# Patient Record
Sex: MALE | Race: BLACK OR AFRICAN AMERICAN | NOT HISPANIC OR LATINO | URBAN - METROPOLITAN AREA
[De-identification: names, ages, dates, MRNs, and addresses within clinical notes are randomized per-mention and may not be internally consistent; named-entity substitution may affect disease eponyms.]

---

## 2023-02-23 ENCOUNTER — OFFICE VISIT (OUTPATIENT)
Dept: FAMILY MEDICINE CLINIC | Facility: CLINIC | Age: 43
End: 2023-02-23

## 2023-02-23 VITALS
RESPIRATION RATE: 16 BRPM | SYSTOLIC BLOOD PRESSURE: 138 MMHG | DIASTOLIC BLOOD PRESSURE: 92 MMHG | BODY MASS INDEX: 38.72 KG/M2 | WEIGHT: 276.6 LBS | TEMPERATURE: 97.1 F | OXYGEN SATURATION: 97 % | HEART RATE: 101 BPM | HEIGHT: 71 IN

## 2023-02-23 DIAGNOSIS — Z11.59 NEED FOR HEPATITIS C SCREENING TEST: ICD-10-CM

## 2023-02-23 DIAGNOSIS — Z91.89 NEED FOR DENTAL CARE: ICD-10-CM

## 2023-02-23 DIAGNOSIS — Z00.00 ANNUAL PHYSICAL EXAM: Primary | ICD-10-CM

## 2023-02-23 DIAGNOSIS — E66.09 CLASS 2 OBESITY DUE TO EXCESS CALORIES WITHOUT SERIOUS COMORBIDITY WITH BODY MASS INDEX (BMI) OF 38.0 TO 38.9 IN ADULT: ICD-10-CM

## 2023-02-23 DIAGNOSIS — G47.33 OSA (OBSTRUCTIVE SLEEP APNEA): ICD-10-CM

## 2023-02-23 DIAGNOSIS — Z11.4 SCREENING FOR HIV (HUMAN IMMUNODEFICIENCY VIRUS): ICD-10-CM

## 2023-02-23 DIAGNOSIS — Z23 ENCOUNTER FOR IMMUNIZATION: ICD-10-CM

## 2023-02-23 DIAGNOSIS — R03.0 ELEVATED BP WITHOUT DIAGNOSIS OF HYPERTENSION: ICD-10-CM

## 2023-02-23 NOTE — PROGRESS NOTES
[address] [facility]    NAME: Reyna Foss  AGE: 38 y o  SEX: male  : 1980     DATE: 2023     Assessment and Plan:       Problem List Items Addressed This Visit        Respiratory    STEPHANIE (obstructive sleep apnea)     Patient at high risk for sleep apnea with snoring, daytime fatigue, witnessed apneic episodes, BMI> 35, and male sex  - Ambulatory referral to sleep medicine placed for consult as well as sleep study  Relevant Orders    Ambulatory Referral to Sleep Medicine       Other    Elevated BP without diagnosis of hypertension     Patient has mildly elevated diastolic pressure today (635/23)  No history of high BP and has never had elevated values on prior readings  Patient has an extremely unhealthy diet (hamburgers for breakfast, lunch, and dinner) and sometimes chinese food  Does not eat many fruits/vegetables  Consumes a fairly high amount of salt  - Explained the risks of  - Counseled patient extensively on appropriate diet as well as exercise and the types of food he should be eating  Mediterranean diet info provided in AVS    - Counseled on low sodium intake  - Will continue to monitor BP for now- advised him that weight loss can help bring his            Class 2 obesity due to excess calories without serious comorbidity with body mass index (BMI) of 38 0 to 38 9 in adult     - Lipid panel and HbA1C ordered            Relevant Orders    Lipid Panel with Direct LDL reflex    HEMOGLOBIN A1C W/ EAG ESTIMATION   Other Visit Diagnoses     Annual physical exam    -  Primary    Need for hepatitis C screening test        Relevant Orders    Hepatitis C Antibody ([facility])    Screening for HIV (human immunodeficiency virus)        Relevant Orders    HIV 1/2 AG/AB w Reflex [facility] for 2 yr old and above    Encounter for immunization        Relevant Orders    influenza vaccine, quadrivalent, 0 5 mL, preservative-free, for adult and pediatric patients 6 mos+ (AFLURIA, FLUARIX, FLULAVAL, FLUZONE) (Completed)    TDAP VACCINE GREATER THAN OR EQUAL TO 8YO IM (Completed)    Need for dental care        Relevant Orders    Ambulatory Referral to Dentistry          Immunizations and preventive care screenings were discussed with patient today  Appropriate education was printed on patient's after visit summary  Counseling:  Alcohol/drug use: discussed moderation in alcohol intake, the recommendations for healthy alcohol use, and avoidance of illicit drug use  Dental Health: discussed importance of regular tooth brushing, flossing, and dental visits  Injury prevention: discussed safety/seat belts, safety helmets, smoke detectors, carbon dioxide detectors, and smoking near bedding or upholstery  Sexual health: discussed sexually transmitted diseases, partner selection, use of condoms, avoidance of unintended pregnancy, and contraceptive alternatives  · Exercise: the importance of regular exercise/physical activity was discussed  Recommend exercise 3-5 times per week for at least 30 minutes  · Patient is currently seeing a financial counselor- he states he just got a job and will be getting insurance on April 1st  He will get his blood work done after that  Patient does not drink, smoke, or use illicit drugs  · Patient received flu and Tdap vaccines today  · PHQ-2: 0  · Form for [facility] regarding possible hypertension filled out today and given to Rolo to return to patient  · HIV and Hepatitis C screening ordered  BMI Counseling: Body mass index is 38 58 kg/m²   The BMI is above normal  Nutrition recommendations include decreasing portion sizes, encouraging healthy choices of fruits and vegetables, decreasing fast food intake, limiting drinks that contain sugar, moderation in carbohydrate intake, increasing intake of lean protein, reducing intake of saturated and trans fat and reducing intake of cholesterol  Exercise recommendations include moderate physical activity 150 minutes/week, exercising 3-5 times per week and obtaining a gym membership  No pharmacotherapy was ordered  Patient referred to PCP  Rationale for BMI follow-up plan is due to patient being overweight or obese  Depression Screening and Follow-up Plan: Patient was screened for depression during today's encounter  They screened negative with a PHQ-2 score of 0  Return in about 2 months (around 4/23/2023) for discuss blood work with Dr Summer Zee   Chief Complaint:     Chief Complaint   Patient presents with   • New Patient Visit     37 y/o    • Hypertension      History of Present Illness:     Adult Annual Physical   Patient here for a comprehensive physical exam  The patient reports no problems  Diet and Physical Activity  · Diet/Nutrition: poor diet, frequent junk food, high fat diet and limited fruits/vegetables  · Exercise: no formal exercise  Depression Screening  PHQ-2/9 Depression Screening    Little interest or pleasure in doing things: 0 - not at all  Feeling down, depressed, or hopeless: 0 - not at all  PHQ-2 Score: 0  PHQ-2 Interpretation: Negative depression screen       General Health  · Sleep: sleeps poorly, gets 4-6 hours of sleep on average, snores loudly, experiences daytime hypersomnolence, unrefreshing sleep and witnessed apnea  · Hearing: normal - bilateral   · Vision: no vision problems and most recent eye exam >1 year ago  · Dental: no dental visits for >1 year, brushes teeth twice daily and flosses teeth occasionally  Referral placed to dentist     [facility]  · Symptoms include: none     Review of Systems:     Review of Systems   Constitutional: Negative for activity change, appetite change, chills, fatigue and fever  Respiratory: Negative for cough and shortness of breath  Cardiovascular: Negative for chest pain, palpitations and leg swelling     Gastrointestinal: Negative for abdominal pain, constipation, diarrhea, nausea and vomiting  Musculoskeletal: Negative for back pain and gait problem  Neurological: Negative for dizziness, seizures, weakness, light-headedness and headaches  Past Medical History:     Past Medical History:   Diagnosis Date   • Hypertension    • Seasonal allergies       Past Surgical History:     Past Surgical History:   Procedure Laterality Date   • NO PAST SURGERIES        Family History:     Family History   Problem Relation Age of Onset   • Hypertension Mother       Social History:     Social History     Socioeconomic History   • Marital status: Single     Spouse name: None   • Number of children: None   • Years of education: None   • Highest education level: None   Occupational History   • None   Tobacco Use   • Smoking status: Never   • Smokeless tobacco: Never   Substance and Sexual Activity   • Alcohol use: Never   • Drug use: None   • Sexual activity: None   Other Topics Concern   • None   Social History Narrative   • None     Social Determinants of Health     Financial Resource Strain: Not on file   Food Insecurity: Not on file   Transportation Needs: No Transportation Needs   • Lack of Transportation (Medical): No   • Lack of Transportation (Non-Medical): No   Physical Activity: Not on file   Stress: Not on file   Social Connections: Not on file   Intimate Partner Violence: Not on file   Housing Stability: Not on file      Current Medications:     No current outpatient medications on file  No current facility-administered medications for this visit  Allergies:     No Known Allergies   Physical Exam:     /92 (BP Location: Left arm, Patient Position: Sitting, Cuff Size: Adult)   Pulse 101   Temp (!) 97 1 °F (36 2 °C) (Temporal)   Resp 16   Ht 5' 11" (1 803 m)   Wt 125 kg (276 lb 9 6 oz)   SpO2 97%   BMI 38 58 kg/m²     Physical Exam  Vitals and nursing note reviewed     Constitutional:       General: He is not in acute distress  Appearance: He is well-developed  He is obese  HENT:      Head: Normocephalic and atraumatic  Eyes:      Conjunctiva/sclera: Conjunctivae normal    Cardiovascular:      Rate and Rhythm: Normal rate and regular rhythm  Heart sounds: No murmur heard  Pulmonary:      Effort: Pulmonary effort is normal  No respiratory distress  Breath sounds: Normal breath sounds  Abdominal:      Palpations: Abdomen is soft  Tenderness: There is no abdominal tenderness  Musculoskeletal:         General: No swelling  Cervical back: Neck supple  Skin:     General: Skin is warm and dry  Capillary Refill: Capillary refill takes less than 2 seconds  Neurological:      Mental Status: He is alert     Psychiatric:         Mood and Affect: Mood normal           MD Joann Quarles 72

## 2023-02-23 NOTE — ASSESSMENT & PLAN NOTE
Patient has mildly elevated diastolic pressure today (085/95)  No history of high BP and has never had elevated values on prior readings  Patient has an extremely unhealthy diet (hamburgers for breakfast, lunch, and dinner) and sometimes chinese food  Does not eat many fruits/vegetables  Consumes a fairly high amount of salt  - Explained the risks of  - Counseled patient extensively on appropriate diet as well as exercise and the types of food he should be eating  Mediterranean diet info provided in AVS    - Counseled on low sodium intake     - Will continue to monitor BP for now- advised him that weight loss can help bring his

## 2023-02-23 NOTE — ASSESSMENT & PLAN NOTE
Patient at high risk for sleep apnea with snoring, daytime fatigue, witnessed apneic episodes, BMI> 35, and male sex  - Ambulatory referral to sleep medicine placed for consult as well as sleep study

## 2023-02-24 ENCOUNTER — TELEPHONE (OUTPATIENT)
Dept: FAMILY MEDICINE CLINIC | Facility: CLINIC | Age: 43
End: 2023-02-24

## 2023-03-09 ENCOUNTER — OFFICE VISIT (OUTPATIENT)
Dept: FAMILY MEDICINE CLINIC | Facility: CLINIC | Age: 43
End: 2023-03-09

## 2023-03-09 VITALS
TEMPERATURE: 98.1 F | DIASTOLIC BLOOD PRESSURE: 84 MMHG | HEART RATE: 83 BPM | OXYGEN SATURATION: 95 % | SYSTOLIC BLOOD PRESSURE: 144 MMHG | WEIGHT: 278 LBS | BODY MASS INDEX: 38.92 KG/M2 | HEIGHT: 71 IN | RESPIRATION RATE: 16 BRPM

## 2023-03-09 DIAGNOSIS — R03.0 ELEVATED BP WITHOUT DIAGNOSIS OF HYPERTENSION: ICD-10-CM

## 2023-03-09 DIAGNOSIS — Z11.3 ROUTINE SCREENING FOR STI (SEXUALLY TRANSMITTED INFECTION): ICD-10-CM

## 2023-03-09 DIAGNOSIS — M54.2 NECK PAIN: Primary | ICD-10-CM

## 2023-03-09 DIAGNOSIS — Z01.20 DENTAL EXAMINATION: ICD-10-CM

## 2023-03-09 RX ORDER — CYCLOBENZAPRINE HCL 10 MG
10 TABLET ORAL 2 TIMES DAILY PRN
Qty: 30 TABLET | Refills: 0 | Status: SHIPPED | OUTPATIENT
Start: 2023-03-09

## 2023-03-09 NOTE — LETTER
March 9, 2023     Patient: Tressa El  YOB: 1980  Date of Visit: 3/9/2023      To Whom it May Concern:    Tressa El is under my professional care  Sommerchente Constanzas was seen in my office on 3/9/2023  Paxton Del Cid may return to work on Monday, 3/13/2023  If you have any questions or concerns, please don't hesitate to call           Sincerely,          LEA Moreno        CC: No Recipients
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
## 2023-03-19 NOTE — ASSESSMENT & PLAN NOTE
BP again elevated in office today: 144/84  - Complete blood work and return for follow up with PCP  Pt states he will do so once insurance active next month  - Recommend DASH eating plan (info included in AVS) and daily physical activity